# Patient Record
Sex: FEMALE | Race: ASIAN | NOT HISPANIC OR LATINO
[De-identification: names, ages, dates, MRNs, and addresses within clinical notes are randomized per-mention and may not be internally consistent; named-entity substitution may affect disease eponyms.]

---

## 2022-05-19 ENCOUNTER — TRANSCRIPTION ENCOUNTER (OUTPATIENT)
Age: 42
End: 2022-05-19

## 2022-05-19 PROCEDURE — 72197 MRI PELVIS W/O & W/DYE: CPT | Mod: 26

## 2022-05-31 ENCOUNTER — APPOINTMENT (OUTPATIENT)
Dept: MRI IMAGING | Facility: CLINIC | Age: 42
End: 2022-05-31
Payer: COMMERCIAL

## 2022-06-24 ENCOUNTER — RESULT REVIEW (OUTPATIENT)
Age: 42
End: 2022-06-24

## 2022-07-25 PROBLEM — Z00.00 ENCOUNTER FOR PREVENTIVE HEALTH EXAMINATION: Status: ACTIVE | Noted: 2022-07-25

## 2022-07-26 ENCOUNTER — NON-APPOINTMENT (OUTPATIENT)
Age: 42
End: 2022-07-26

## 2022-08-23 ENCOUNTER — NON-APPOINTMENT (OUTPATIENT)
Age: 42
End: 2022-08-23

## 2022-08-23 ENCOUNTER — APPOINTMENT (OUTPATIENT)
Dept: COLORECTAL SURGERY | Facility: CLINIC | Age: 42
End: 2022-08-23

## 2022-08-23 VITALS
WEIGHT: 145 LBS | BODY MASS INDEX: 24.75 KG/M2 | HEIGHT: 64 IN | HEART RATE: 73 BPM | DIASTOLIC BLOOD PRESSURE: 88 MMHG | TEMPERATURE: 97.9 F | SYSTOLIC BLOOD PRESSURE: 130 MMHG

## 2022-08-23 DIAGNOSIS — K62.89 OTHER SPECIFIED DISEASES OF ANUS AND RECTUM: ICD-10-CM

## 2022-08-23 DIAGNOSIS — N80.9 ENDOMETRIOSIS, UNSPECIFIED: ICD-10-CM

## 2022-08-23 PROCEDURE — 99204 OFFICE O/P NEW MOD 45 MIN: CPT

## 2022-08-23 NOTE — PHYSICAL EXAM
[FreeTextEntry1] : General no acute distress, alert and oriented\par Psych calm, pleasant demeanor, responding appropriately to question\par Well nourished\par Comfortable in chair\par Ambulating without assistance\par Nonlabored breathing\par Abdomen soft, nontender, no surgical scars\par

## 2022-08-23 NOTE — HISTORY OF PRESENT ILLNESS
[FreeTextEntry1] : 43 yo F presents for evaluation of endometriosis, referred by GYN Dr. Khari Blancas\par PMH DMII, hypothyroidism, asthma (on Advair seasonally), h/o abnormal cervical paps, s/p LEEP (2016), hysteroscopy (7/2020), s/p polyp removal\par h/o sAB x 1 (IVF)\par \par Per GYN Anita notes, h/o dysmenorrhea, dyspareunia, dyschezia that has been worsening over the last 5 years. Previously on OCPs for 20 years and Nuvaring.\par \par Of note, patient followed by outside GYN in [facility] as well as [facility] and has undergone IVF and embryo transfers in the past. Has been recommended to address pelvic pain/endometriosis prior to consideration of resuming embryo transfers if possible. If fails, plans to adopt.\par \par MRI pelvis completed at [facility] 5/19/22 for surgical planning:\par EXAM: [id number] - MR PELVIS WAW IC - ORDERED BY: KHARI BLANCAS\par \par \par PROCEDURE DATE: 05/19/2022\par \par INTERPRETATION: CLINICAL INFORMATION: Preop for endometriosis surgery\par \par COMPARISON: None.\par \par CONTRAST/COMPLICATIONS:\par IV Contrast: Gadavist 7 cc administered 0 cc discarded.\par Oral Contrast: NONE\par Complications: None reported at time of study completion\par \par PROCEDURE:\par MRI of the pelvis was performed with Endometriosis Protocol.\par Vaginal and Rectal Contrast administered:\par \par FINDINGS:\par UTERUS: 12 x 6 x 8 cm.\par ENDOMETRIUM: 9 mm, normal.\par JUNCTIONAL ZONE: 14 mm.\par CERVIX: Normal, no endometriosis implants.\par \par LEIOMYOMAS: 3 cm degenerated fundal fibroid.\par ADENOMYOMAS: 3 cm left fundal.\par \par ANTERIOR COMPARTMENT:\par \par BLADDER: Within normal limits.\par URETERS: Normal\par VESICOUTERINE POUCH: Normal\par VESICOVAGINAL SEPTUM: Normal\par PREVASCULAR SPACE: Normal\par \par MIDDLE COMPARTMENT:\par \par RIGHT OVARY: 2.6 x 1.4 cm, normal.\par LEFT OVARY: 3.2 x 2.8 cm, corpus luteal cyst. 1 cm endometrial implant medial to the ovary (42:35).\par FALLOPIAN TUBES: 1.5 cm endometrial implant left mesosalpinx (42:34).\par TORUS UTERINUS (Uterine Body): Linear T2 dark bands with tethering suggestive of deep infiltrating endometriosis.\par UTERINE LIGAMENTS: Normal\par VAGINA: Normal\par \par POSTERIOR COMPARTMENT:\par \par RETROCERVICAL SPACE:\par ANTERIOR RECTAL WALL: Possible involvement of the serosa without deep invasion.\par -Length of lesion: 2 cm\par -Circumferential extent (%): 20%\par -Invasion of muscular wall present: No\par -Distance to anal verge: 7 cm\par UTEROSACRAL LIGAMENT: Equivocal involvement\par RECTOVAGINAL SPACE/SEPTUM: Equivocal involvement\par \par ADDITIONAL BOWEL LESIONS PRESENT?: No.\par ADDITIONAL SITES OF ENDOMETRIOSIS?: No.\par \par LYMPH NODES: No pelvic lymphadenopathy.\par PELVIC FREE FLUID: None.\par BONES: Within normal limits.\par \par VISUALIZED UPPER ABDOMINAL ORGANS: Within normal limits.\par \par IMPRESSION:\par \par DIE torus uterus/posterior cul-de-sac. Abutment of the anterior rectal wall without muscular wall invasion.\par \par Small endometriosis implants in the left adnexa.\par \par --- End of Report ---\par \par Colonoscopy completed 6/24/2022 w/ GI Dr. Samira Madden which noted internal and external hemorrhoids, colon was otherwise normal. Random biopsies in rectum performed to r/o endometriosis. Pathology was colonic mucosa w/o abnormality\par \par \par Dr. Blancas has referred patient for combined surgical planning. DOS -  10/24/2022 \par Pt presents for surgical consultation\par \par Pt admits stabbing rectal pain during menses which happen at random times. Has to take ibuprofen 800 mg every 4-5 hours x 2 worst days during menses to manage pain\par Admits to rare instances of BRBPR in setting of straining depending on diet (if eats less fiber)\par BH: once daily\par Typically gets adequate fiber\par Not taking fiber supplementation\par Admits to constipation from ibuprofen takes docusate as needed\par Denies FMH CRC, IBD

## 2022-08-23 NOTE — ASSESSMENT
[FreeTextEntry1] : I had extensive discussion with the patient regarding the diagnosis and treatment options. \par \par Outside records obtained and reviewed. Discussed with referring provider GYN Dr Howard. Patient referred for planning for surgical intervention with concern for multivisceral involvement of endometriosis.\par \par Role for multispecialty surgical planning discussed given prior history and extent of endometriosis seen on imaging.\par \par MRI and colonoscopy reviewed.\par \par Depending on degree of involvement with intestinal tract, we discussed indications for possible colorectal surgical intervention, such as lysis of adhesions, excision of endometrial implants from bowel (shave vs. discoid resection) , and/or segmental bowel resection (appendectomy, small bowel resection, low anterior resection). \par Minimally invasive surgery vs open surgery discussed. \par \par The associated risks, benefits, alternatives of the procedure have been outlined discussed and reviewed with the patient. These risks including but not limited to bleeding, infection, injury to adjacent organs, anastomotic leak, bowel stricture, need for secondary surgery, need for ileostomy or colostomy creation, incisional dehiscence, incisional hernia, change in bowel habits, change in urinary function, nerve injury, change in sexual function, as well as the risk of heart and lung complications, stroke, DVT/PE and death were detailed. \par The possibility of recurrent or continued symptoms, requiring continued medical management despite surgical intervention, was discussed.\par All questions were answered, the patient expressed understanding and consents to the planned procedure. \par \par Appropriate literature regarding surgery and post operative treatment/complications and enhanced recovery pathway will be provided to patient. Consent was obtained.\par  \par \par

## 2022-08-30 DIAGNOSIS — Z83.3 FAMILY HISTORY OF DIABETES MELLITUS: ICD-10-CM

## 2022-08-30 DIAGNOSIS — Z87.891 PERSONAL HISTORY OF NICOTINE DEPENDENCE: ICD-10-CM

## 2022-08-30 DIAGNOSIS — J45.909 UNSPECIFIED ASTHMA, UNCOMPLICATED: ICD-10-CM

## 2022-09-30 ENCOUNTER — RESULT REVIEW (OUTPATIENT)
Age: 42
End: 2022-09-30

## 2022-10-03 ENCOUNTER — OUTPATIENT (OUTPATIENT)
Dept: OUTPATIENT SERVICES | Facility: HOSPITAL | Age: 42
LOS: 1 days | End: 2022-10-03
Payer: COMMERCIAL

## 2022-10-03 DIAGNOSIS — Z01.818 ENCOUNTER FOR OTHER PREPROCEDURAL EXAMINATION: ICD-10-CM

## 2022-10-04 PROCEDURE — 86901 BLOOD TYPING SEROLOGIC RH(D): CPT

## 2022-10-04 PROCEDURE — 86850 RBC ANTIBODY SCREEN: CPT

## 2022-10-04 PROCEDURE — 86900 BLOOD TYPING SEROLOGIC ABO: CPT

## 2022-10-14 DIAGNOSIS — Z01.818 ENCOUNTER FOR OTHER PREPROCEDURAL EXAMINATION: ICD-10-CM

## 2022-10-21 ENCOUNTER — LABORATORY RESULT (OUTPATIENT)
Age: 42
End: 2022-10-21

## 2022-10-21 NOTE — ASU PATIENT PROFILE, ADULT - REASON FOR ADMISSION, PROFILE
robotic excision of endometriosis , robotic myomectomy, -[facility], colon surgery with Dr. Sandoval LEEP

## 2022-10-21 NOTE — ASU PATIENT PROFILE, ADULT - NSICDXPASTMEDICALHX_GEN_ALL_CORE_FT
PAST MEDICAL HISTORY:  Asthma     DM (diabetes mellitus)     Endometriosis     Hypothyroid      PAST MEDICAL HISTORY:  Asthma with allergies    DM (diabetes mellitus)     Endometriosis     Hypothyroid

## 2022-10-23 ENCOUNTER — TRANSCRIPTION ENCOUNTER (OUTPATIENT)
Age: 42
End: 2022-10-23

## 2022-10-24 ENCOUNTER — TRANSCRIPTION ENCOUNTER (OUTPATIENT)
Age: 42
End: 2022-10-24

## 2022-10-24 ENCOUNTER — OUTPATIENT (OUTPATIENT)
Dept: OUTPATIENT SERVICES | Facility: HOSPITAL | Age: 42
LOS: 1 days | Discharge: HOME | End: 2022-10-24
Payer: COMMERCIAL

## 2022-10-24 ENCOUNTER — RESULT REVIEW (OUTPATIENT)
Age: 42
End: 2022-10-24

## 2022-10-24 VITALS
HEART RATE: 58 BPM | DIASTOLIC BLOOD PRESSURE: 76 MMHG | OXYGEN SATURATION: 99 % | SYSTOLIC BLOOD PRESSURE: 152 MMHG | RESPIRATION RATE: 14 BRPM

## 2022-10-24 VITALS
TEMPERATURE: 98 F | DIASTOLIC BLOOD PRESSURE: 83 MMHG | HEART RATE: 68 BPM | RESPIRATION RATE: 16 BRPM | HEIGHT: 64 IN | WEIGHT: 152.78 LBS | OXYGEN SATURATION: 98 % | SYSTOLIC BLOOD PRESSURE: 129 MMHG

## 2022-10-24 DIAGNOSIS — Z98.890 OTHER SPECIFIED POSTPROCEDURAL STATES: Chronic | ICD-10-CM

## 2022-10-24 LAB
GLUCOSE BLDC GLUCOMTR-MCNC: 185 MG/DL — HIGH (ref 70–99)
GLUCOSE BLDC GLUCOMTR-MCNC: 191 MG/DL — HIGH (ref 70–99)

## 2022-10-24 PROCEDURE — 88341 IMHCHEM/IMCYTCHM EA ADD ANTB: CPT

## 2022-10-24 PROCEDURE — 88360 TUMOR IMMUNOHISTOCHEM/MANUAL: CPT | Mod: 26

## 2022-10-24 PROCEDURE — 88304 TISSUE EXAM BY PATHOLOGIST: CPT

## 2022-10-24 PROCEDURE — 88341 IMHCHEM/IMCYTCHM EA ADD ANTB: CPT | Mod: 26,59

## 2022-10-24 PROCEDURE — 88307 TISSUE EXAM BY PATHOLOGIST: CPT | Mod: 26

## 2022-10-24 PROCEDURE — 88307 TISSUE EXAM BY PATHOLOGIST: CPT

## 2022-10-24 PROCEDURE — 88304 TISSUE EXAM BY PATHOLOGIST: CPT | Mod: 26

## 2022-10-24 PROCEDURE — 82962 GLUCOSE BLOOD TEST: CPT

## 2022-10-24 PROCEDURE — 88342 IMHCHEM/IMCYTCHM 1ST ANTB: CPT | Mod: 26,59

## 2022-10-24 PROCEDURE — 88360 TUMOR IMMUNOHISTOCHEM/MANUAL: CPT

## 2022-10-24 PROCEDURE — 57522 CONIZATION OF CERVIX: CPT

## 2022-10-24 RX ORDER — ACETAMINOPHEN 500 MG
1000 TABLET ORAL ONCE
Refills: 0 | Status: DISCONTINUED | OUTPATIENT
Start: 2022-10-24 | End: 2022-10-24

## 2022-10-24 RX ORDER — ONDANSETRON 8 MG/1
8 TABLET, FILM COATED ORAL ONCE
Refills: 0 | Status: DISCONTINUED | OUTPATIENT
Start: 2022-10-24 | End: 2022-10-24

## 2022-10-24 RX ORDER — IODINE/POTASSIUM IODIDE 5 %-10 %
1 SOLUTION, NON-ORAL TOPICAL ONCE
Refills: 0 | Status: DISCONTINUED | OUTPATIENT
Start: 2022-10-24 | End: 2022-10-24

## 2022-10-24 RX ORDER — METFORMIN HYDROCHLORIDE 850 MG/1
1 TABLET ORAL
Qty: 0 | Refills: 0 | DISCHARGE

## 2022-10-24 RX ORDER — SIMETHICONE 80 MG/1
80 TABLET, CHEWABLE ORAL ONCE
Refills: 0 | Status: DISCONTINUED | OUTPATIENT
Start: 2022-10-24 | End: 2022-10-24

## 2022-10-24 RX ORDER — OXYCODONE HYDROCHLORIDE 5 MG/1
5 TABLET ORAL ONCE
Refills: 0 | Status: DISCONTINUED | OUTPATIENT
Start: 2022-10-24 | End: 2022-10-24

## 2022-10-24 RX ORDER — LEVOTHYROXINE SODIUM 125 MCG
1 TABLET ORAL
Qty: 0 | Refills: 0 | DISCHARGE

## 2022-10-24 RX ORDER — ACETIC ACID
1 LIQUID (ML) MISCELLANEOUS ONCE
Refills: 0 | Status: DISCONTINUED | OUTPATIENT
Start: 2022-10-24 | End: 2022-10-24

## 2022-10-24 RX ADMIN — OXYCODONE HYDROCHLORIDE 5 MILLIGRAM(S): 5 TABLET ORAL at 15:37

## 2022-10-24 RX ADMIN — OXYCODONE HYDROCHLORIDE 5 MILLIGRAM(S): 5 TABLET ORAL at 15:55

## 2022-10-24 NOTE — ASU DISCHARGE PLAN (ADULT/PEDIATRIC) - NS MD DC FALL RISK RISK
For information on Fall & Injury Prevention, visit: [email or web address] OR  [email or web address] OR  https://www.cdc.gov/steadi/patient.html

## 2022-10-24 NOTE — ASU DISCHARGE PLAN (ADULT/PEDIATRIC) - CARE PROVIDER_API CALL
Emily Howard (MD)  Female Pelvic MedReconst Surg; Obstetrics and Gynecology  [address] [place], [place] [address]  Phone: [phone]  Fax: [phone]  Follow Up Time:

## 2022-10-24 NOTE — PRE-ANESTHESIA EVALUATION ADULT - NSANTHOSAYNRD_GEN_A_CORE
No. LELA screening performed.  STOP BANG Legend: 0-2 = LOW Risk; 3-4 = INTERMEDIATE Risk; 5-8 = HIGH Risk

## 2022-10-24 NOTE — PRE-OP CHECKLIST - WEIGHT IN KG
69.3 Detail Level: Generalized Quality 110: Preventive Care And Screening: Influenza Immunization: Influenza Immunization Administered during Influenza season

## 2022-10-31 PROBLEM — E03.9 HYPOTHYROIDISM, UNSPECIFIED: Chronic | Status: ACTIVE | Noted: 2022-10-21

## 2022-10-31 PROBLEM — E11.9 TYPE 2 DIABETES MELLITUS WITHOUT COMPLICATIONS: Chronic | Status: ACTIVE | Noted: 2022-10-21

## 2022-10-31 PROBLEM — J45.909 UNSPECIFIED ASTHMA, UNCOMPLICATED: Chronic | Status: ACTIVE | Noted: 2022-10-21

## 2022-10-31 PROBLEM — N80.9 ENDOMETRIOSIS, UNSPECIFIED: Chronic | Status: ACTIVE | Noted: 2022-10-21

## 2022-11-01 LAB — SURGICAL PATHOLOGY STUDY: SIGNIFICANT CHANGE UP

## 2022-12-11 ENCOUNTER — TRANSCRIPTION ENCOUNTER (OUTPATIENT)
Age: 42
End: 2022-12-11

## 2022-12-12 ENCOUNTER — TRANSCRIPTION ENCOUNTER (OUTPATIENT)
Age: 42
End: 2022-12-12

## 2022-12-12 ENCOUNTER — APPOINTMENT (OUTPATIENT)
Dept: COLORECTAL SURGERY | Facility: HOSPITAL | Age: 42
End: 2022-12-12

## 2022-12-12 ENCOUNTER — OUTPATIENT (OUTPATIENT)
Dept: OUTPATIENT SERVICES | Facility: HOSPITAL | Age: 42
LOS: 1 days | Discharge: HOME | End: 2022-12-12
Payer: COMMERCIAL

## 2022-12-12 ENCOUNTER — APPOINTMENT (OUTPATIENT)
Dept: COLORECTAL SURGERY | Facility: CLINIC | Age: 42
End: 2022-12-12

## 2022-12-12 VITALS
SYSTOLIC BLOOD PRESSURE: 134 MMHG | HEART RATE: 68 BPM | DIASTOLIC BLOOD PRESSURE: 90 MMHG | RESPIRATION RATE: 16 BRPM | WEIGHT: 147.27 LBS | OXYGEN SATURATION: 97 % | TEMPERATURE: 97 F | HEIGHT: 64 IN

## 2022-12-12 VITALS
DIASTOLIC BLOOD PRESSURE: 72 MMHG | RESPIRATION RATE: 17 BRPM | HEART RATE: 65 BPM | SYSTOLIC BLOOD PRESSURE: 122 MMHG | OXYGEN SATURATION: 99 %

## 2022-12-12 DIAGNOSIS — Z98.890 OTHER SPECIFIED POSTPROCEDURAL STATES: Chronic | ICD-10-CM

## 2022-12-12 LAB
ANION GAP SERPL CALC-SCNC: 12 MMOL/L — SIGNIFICANT CHANGE UP (ref 5–17)
BASOPHILS # BLD AUTO: 0.04 K/UL — SIGNIFICANT CHANGE UP (ref 0–0.2)
BASOPHILS NFR BLD AUTO: 0.5 % — SIGNIFICANT CHANGE UP (ref 0–2)
BLD GP AB SCN SERPL QL: NEGATIVE — SIGNIFICANT CHANGE UP
BUN SERPL-MCNC: 9 MG/DL — SIGNIFICANT CHANGE UP (ref 7–23)
CALCIUM SERPL-MCNC: 9.2 MG/DL — SIGNIFICANT CHANGE UP (ref 8.4–10.5)
CHLORIDE SERPL-SCNC: 99 MMOL/L — SIGNIFICANT CHANGE UP (ref 96–108)
CO2 SERPL-SCNC: 23 MMOL/L — SIGNIFICANT CHANGE UP (ref 22–31)
CREAT SERPL-MCNC: 0.56 MG/DL — SIGNIFICANT CHANGE UP (ref 0.5–1.3)
EGFR: 115 ML/MIN/1.73M2 — SIGNIFICANT CHANGE UP
EOSINOPHIL # BLD AUTO: 0.04 K/UL — SIGNIFICANT CHANGE UP (ref 0–0.5)
EOSINOPHIL NFR BLD AUTO: 0.5 % — SIGNIFICANT CHANGE UP (ref 0–6)
GLUCOSE BLDC GLUCOMTR-MCNC: 152 MG/DL — HIGH (ref 70–99)
GLUCOSE BLDC GLUCOMTR-MCNC: 204 MG/DL — HIGH (ref 70–99)
GLUCOSE BLDC GLUCOMTR-MCNC: 277 MG/DL — HIGH (ref 70–99)
GLUCOSE BLDC GLUCOMTR-MCNC: 284 MG/DL — HIGH (ref 70–99)
GLUCOSE SERPL-MCNC: 210 MG/DL — HIGH (ref 70–99)
HCT VFR BLD CALC: 37.4 % — SIGNIFICANT CHANGE UP (ref 34.5–45)
HGB BLD-MCNC: 12.4 G/DL — SIGNIFICANT CHANGE UP (ref 11.5–15.5)
IMM GRANULOCYTES NFR BLD AUTO: 0.3 % — SIGNIFICANT CHANGE UP (ref 0–0.9)
LYMPHOCYTES # BLD AUTO: 1.85 K/UL — SIGNIFICANT CHANGE UP (ref 1–3.3)
LYMPHOCYTES # BLD AUTO: 21.4 % — SIGNIFICANT CHANGE UP (ref 13–44)
MCHC RBC-ENTMCNC: 26.4 PG — LOW (ref 27–34)
MCHC RBC-ENTMCNC: 33.2 GM/DL — SIGNIFICANT CHANGE UP (ref 32–36)
MCV RBC AUTO: 79.7 FL — LOW (ref 80–100)
MONOCYTES # BLD AUTO: 0.28 K/UL — SIGNIFICANT CHANGE UP (ref 0–0.9)
MONOCYTES NFR BLD AUTO: 3.2 % — SIGNIFICANT CHANGE UP (ref 2–14)
NEUTROPHILS # BLD AUTO: 6.42 K/UL — SIGNIFICANT CHANGE UP (ref 1.8–7.4)
NEUTROPHILS NFR BLD AUTO: 74.1 % — SIGNIFICANT CHANGE UP (ref 43–77)
NRBC # BLD: 0 /100 WBCS — SIGNIFICANT CHANGE UP (ref 0–0)
PLATELET # BLD AUTO: 356 K/UL — SIGNIFICANT CHANGE UP (ref 150–400)
POTASSIUM SERPL-MCNC: 4.3 MMOL/L — SIGNIFICANT CHANGE UP (ref 3.5–5.3)
POTASSIUM SERPL-SCNC: 4.3 MMOL/L — SIGNIFICANT CHANGE UP (ref 3.5–5.3)
RBC # BLD: 4.69 M/UL — SIGNIFICANT CHANGE UP (ref 3.8–5.2)
RBC # FLD: 12.8 % — SIGNIFICANT CHANGE UP (ref 10.3–14.5)
RH IG SCN BLD-IMP: POSITIVE — SIGNIFICANT CHANGE UP
SODIUM SERPL-SCNC: 134 MMOL/L — LOW (ref 135–145)
WBC # BLD: 8.66 K/UL — SIGNIFICANT CHANGE UP (ref 3.8–10.5)
WBC # FLD AUTO: 8.66 K/UL — SIGNIFICANT CHANGE UP (ref 3.8–10.5)

## 2022-12-12 PROCEDURE — 58661 LAPAROSCOPY REMOVE ADNEXA: CPT | Mod: LT

## 2022-12-12 PROCEDURE — S2900: CPT

## 2022-12-12 PROCEDURE — 86901 BLOOD TYPING SEROLOGIC RH(D): CPT

## 2022-12-12 PROCEDURE — 86900 BLOOD TYPING SEROLOGIC ABO: CPT

## 2022-12-12 PROCEDURE — 86850 RBC ANTIBODY SCREEN: CPT

## 2022-12-12 PROCEDURE — 58662 LAPAROSCOPY EXCISE LESIONS: CPT

## 2022-12-12 PROCEDURE — 45330 DIAGNOSTIC SIGMOIDOSCOPY: CPT

## 2022-12-12 PROCEDURE — 45330 DIAGNOSTIC SIGMOIDOSCOPY: CPT | Mod: GC

## 2022-12-12 PROCEDURE — 82962 GLUCOSE BLOOD TEST: CPT

## 2022-12-12 PROCEDURE — 58662 LAPAROSCOPY EXCISE LESIONS: CPT | Mod: AS

## 2022-12-12 PROCEDURE — 88305 TISSUE EXAM BY PATHOLOGIST: CPT | Mod: 26

## 2022-12-12 PROCEDURE — C1889: CPT

## 2022-12-12 PROCEDURE — 85025 COMPLETE CBC W/AUTO DIFF WBC: CPT

## 2022-12-12 PROCEDURE — 80048 BASIC METABOLIC PNL TOTAL CA: CPT

## 2022-12-12 PROCEDURE — 58662 LAPAROSCOPY EXCISE LESIONS: CPT | Mod: GC

## 2022-12-12 PROCEDURE — 58545 LAPAROSCOPIC MYOMECTOMY: CPT | Mod: AS

## 2022-12-12 PROCEDURE — 88305 TISSUE EXAM BY PATHOLOGIST: CPT

## 2022-12-12 PROCEDURE — 58545 LAPAROSCOPIC MYOMECTOMY: CPT

## 2022-12-12 PROCEDURE — 57455 BIOPSY OF CERVIX W/SCOPE: CPT

## 2022-12-12 DEVICE — SURGIFLO HEMOSTATIC MATRIX KIT: Type: IMPLANTABLE DEVICE | Status: FUNCTIONAL

## 2022-12-12 RX ORDER — PHENAZOPYRIDINE HCL 100 MG
200 TABLET ORAL ONCE
Refills: 0 | Status: COMPLETED | OUTPATIENT
Start: 2022-12-12 | End: 2022-12-12

## 2022-12-12 RX ORDER — ACETAMINOPHEN 500 MG
1000 TABLET ORAL ONCE
Refills: 0 | Status: COMPLETED | OUTPATIENT
Start: 2022-12-12 | End: 2022-12-12

## 2022-12-12 RX ORDER — KETOROLAC TROMETHAMINE 30 MG/ML
30 SYRINGE (ML) INJECTION ONCE
Refills: 0 | Status: DISCONTINUED | OUTPATIENT
Start: 2022-12-12 | End: 2022-12-12

## 2022-12-12 RX ORDER — SODIUM CHLORIDE 9 MG/ML
1000 INJECTION, SOLUTION INTRAVENOUS
Refills: 0 | Status: DISCONTINUED | OUTPATIENT
Start: 2022-12-12 | End: 2022-12-12

## 2022-12-12 RX ORDER — GLUCAGON INJECTION, SOLUTION 0.5 MG/.1ML
1 INJECTION, SOLUTION SUBCUTANEOUS ONCE
Refills: 0 | Status: DISCONTINUED | OUTPATIENT
Start: 2022-12-12 | End: 2022-12-12

## 2022-12-12 RX ORDER — HYDROMORPHONE HYDROCHLORIDE 2 MG/ML
0.5 INJECTION INTRAMUSCULAR; INTRAVENOUS; SUBCUTANEOUS
Refills: 0 | Status: DISCONTINUED | OUTPATIENT
Start: 2022-12-12 | End: 2022-12-12

## 2022-12-12 RX ORDER — OXYCODONE HYDROCHLORIDE 5 MG/1
5 TABLET ORAL ONCE
Refills: 0 | Status: DISCONTINUED | OUTPATIENT
Start: 2022-12-12 | End: 2022-12-12

## 2022-12-12 RX ORDER — ONDANSETRON 8 MG/1
8 TABLET, FILM COATED ORAL ONCE
Refills: 0 | Status: COMPLETED | OUTPATIENT
Start: 2022-12-12 | End: 2022-12-12

## 2022-12-12 RX ORDER — ACETAMINOPHEN 500 MG
1000 TABLET ORAL ONCE
Refills: 0 | Status: DISCONTINUED | OUTPATIENT
Start: 2022-12-12 | End: 2022-12-12

## 2022-12-12 RX ORDER — DEXTROSE 50 % IN WATER 50 %
25 SYRINGE (ML) INTRAVENOUS ONCE
Refills: 0 | Status: DISCONTINUED | OUTPATIENT
Start: 2022-12-12 | End: 2022-12-12

## 2022-12-12 RX ORDER — DEXTROSE 50 % IN WATER 50 %
15 SYRINGE (ML) INTRAVENOUS ONCE
Refills: 0 | Status: DISCONTINUED | OUTPATIENT
Start: 2022-12-12 | End: 2022-12-12

## 2022-12-12 RX ORDER — SIMETHICONE 80 MG/1
80 TABLET, CHEWABLE ORAL ONCE
Refills: 0 | Status: DISCONTINUED | OUTPATIENT
Start: 2022-12-12 | End: 2022-12-12

## 2022-12-12 RX ORDER — PANTOPRAZOLE SODIUM 20 MG/1
20 TABLET, DELAYED RELEASE ORAL ONCE
Refills: 0 | Status: COMPLETED | OUTPATIENT
Start: 2022-12-12 | End: 2022-12-12

## 2022-12-12 RX ADMIN — ONDANSETRON 8 MILLIGRAM(S): 8 TABLET, FILM COATED ORAL at 17:28

## 2022-12-12 RX ADMIN — Medication 400 MILLIGRAM(S): at 14:23

## 2022-12-12 RX ADMIN — Medication 30 MILLIGRAM(S): at 16:30

## 2022-12-12 RX ADMIN — Medication 30 MILLIGRAM(S): at 18:19

## 2022-12-12 RX ADMIN — HYDROMORPHONE HYDROCHLORIDE 0.5 MILLIGRAM(S): 2 INJECTION INTRAMUSCULAR; INTRAVENOUS; SUBCUTANEOUS at 15:22

## 2022-12-12 RX ADMIN — Medication 200 MILLIGRAM(S): at 07:05

## 2022-12-12 RX ADMIN — Medication 1000 MILLIGRAM(S): at 14:55

## 2022-12-12 RX ADMIN — PANTOPRAZOLE SODIUM 20 MILLIGRAM(S): 20 TABLET, DELAYED RELEASE ORAL at 16:31

## 2022-12-12 RX ADMIN — HYDROMORPHONE HYDROCHLORIDE 0.5 MILLIGRAM(S): 2 INJECTION INTRAMUSCULAR; INTRAVENOUS; SUBCUTANEOUS at 15:07

## 2022-12-12 NOTE — ASU PATIENT PROFILE, ADULT - NSICDXPASTMEDICALHX_GEN_ALL_CORE_FT
PAST MEDICAL HISTORY:  Asthma with allergies    DM (diabetes mellitus)     Endometriosis     Hypothyroid

## 2022-12-12 NOTE — BRIEF OPERATIVE NOTE - NSICDXBRIEFPREOP_GEN_ALL_CORE_FT
PRE-OP DIAGNOSIS:  Endometriosis 12-Dec-2022 13:17:29  Max Tabor  
PRE-OP DIAGNOSIS:  Uterine myoma 12-Dec-2022 13:52:12  Mariela Orta

## 2022-12-12 NOTE — CHART NOTE - NSCHARTNOTEFT_GEN_A_CORE
GYN POC    Pt seen and examined at bedside. Pt complains of mild abdominal "numbness". Otherwise feeling well, ambulating without assistance, voided, not yet passing flatus. Pt denies lightheadedness/dizziness, HA, fevers/chills, chest pain, palpitations, SOB, severe abdominal pain, N/V/D, or dysuria.      T(F): 98.2 (12-12-22 @ 14:13), Max: 98.2 (12-12-22 @ 14:13)  HR: 65 (12-12-22 @ 18:00) (47 - 68)  BP: 122/72 (12-12-22 @ 18:00) (117/67 - 141/71)  RR: 17 (12-12-22 @ 18:00) (12 - 17)  SpO2: 99% (12-12-22 @ 18:00) (95% - 100%)  Wt(kg): --    12-12 @ 07:01  -  12-12 @ 18:32  --------------------------------------------------------  IN: 375 mL / OUT: 200 mL / NET: 175 mL        acetaminophen     Tablet .. 1000 milliGRAM(s) Oral Once  dextrose 5%. 1000 milliLiter(s) IV Continuous <Continuous>  dextrose 50% Injectable 25 Gram(s) IV Push once  dextrose Oral Gel 15 Gram(s) Oral once PRN Blood Glucose LESS THAN 70 milliGRAM(s)/deciliter  glucagon  Injectable 1 milliGRAM(s) IntraMuscular once  HYDROmorphone  Injectable 0.5 milliGRAM(s) IV Push every 15 minutes PRN Severe Pain (7 - 10)  lactated ringers. 1000 milliLiter(s) IV Continuous <Continuous>  oxyCODONE    IR 5 milliGRAM(s) Oral Once PRN Moderate Pain (4 - 6)  simethicone 80 milliGRAM(s) Chew Once PRN Gas      Physical exam:  Constitutional: NAD  Pulmonary: clear to auscultation bilaterally  Cardiovascular: regular rate and rhythm  Abdomen: incision sites clean, dry and intact. Soft, mildly tender, nondistended  Extremities: no lower extremity edema, or calf tenderness    A/P: 41yo s/p RA l/s endo excision, rectal implant resection with flex sig +neg air leak test, L salpingectomy for hydrosalpinx, myomectomy. EBL 50.     Neuro: Toradol/Tylenol standing, Oxycodone prn, Dilaudid for breakthrough  Cardio: vital signs stable, continue to monitor per protocol  Pulm: incentive spirometer at least 10 times per hour while awake   GI:  Reg diet, Reglan, Zofran prn  : voiding    - Pt stable for d/c at this time

## 2022-12-12 NOTE — BRIEF OPERATIVE NOTE - NSICDXBRIEFPROCEDURE_GEN_ALL_CORE_FT
PROCEDURES:  Myomectomy, robot-assisted, laparoscopic, 1-4 myomas 12-Dec-2022 13:51:21  Mariela Orta  Left salpingectomy 12-Dec-2022 13:51:48  Mariela Orta  
PROCEDURES:  Robot-assisted laparoscopic excision of endometriosis 12-Dec-2022 13:18:07  Max Tabor  Flexible sigmoidoscopy 12-Dec-2022 13:18:16  Max Tabor

## 2022-12-12 NOTE — BRIEF OPERATIVE NOTE - NSICDXBRIEFPOSTOP_GEN_ALL_CORE_FT
POST-OP DIAGNOSIS:  Endometriosis 12-Dec-2022 13:17:39  Max Tabor  
POST-OP DIAGNOSIS:  Uterine myoma 12-Dec-2022 13:52:18  Mariela Orta

## 2022-12-12 NOTE — BRIEF OPERATIVE NOTE - OPERATION/FINDINGS
Majority of case completed by OBGYN team, please refer to separate brief op note for further details.     Multiple rectosigmoid superficial serosal endometrial implants sharply excised, single rectal serosal endometrial nodule appreciated and sharply excised, serosal defect oversewn w vicryl suture. Flex sig done with patient in supine position, negative air leak test. Majority of case completed by OBGYN team, please refer to separate brief op note for further details.     Multiple rectosigmoid superficial serosal endometrial adhesions sharply excised. Multiple endometrial implants excised from peritoneal surface of the sigmoid colon mesenteric fat. A single rectal serosal endometrial nodule appreciated and sharply excised, serosal excision site oversewn w 3-0 silk suture. Flex sig done with patient in lithotomy position, negative air leak test.

## 2022-12-12 NOTE — ASU DISCHARGE PLAN (ADULT/PEDIATRIC) - CARE PROVIDER_API CALL
Emily Howard (MD)  Female Pelvic MedReconst Surg; Obstetrics and Gynecology  [address] [place], [place] [address]  Phone: [phone]  Fax: [phone]  Follow Up Time: 2 weeks

## 2022-12-12 NOTE — ASU DISCHARGE PLAN (ADULT/PEDIATRIC) - NS MD DC FALL RISK RISK
For information on Fall & Injury Prevention, visit: [email or web address] OR  [email or web address] OR  https://www.cdc.gov/steadi/patient.html

## 2023-01-11 LAB — SURGICAL PATHOLOGY STUDY: SIGNIFICANT CHANGE UP

## 2023-01-13 ENCOUNTER — APPOINTMENT (OUTPATIENT)
Dept: ENDOCRINOLOGY | Facility: CLINIC | Age: 43
End: 2023-01-13
Payer: COMMERCIAL

## 2023-01-13 ENCOUNTER — NON-APPOINTMENT (OUTPATIENT)
Age: 43
End: 2023-01-13

## 2023-01-13 VITALS
SYSTOLIC BLOOD PRESSURE: 130 MMHG | WEIGHT: 141 LBS | TEMPERATURE: 97.4 F | DIASTOLIC BLOOD PRESSURE: 80 MMHG | HEIGHT: 64 IN | HEART RATE: 68 BPM | OXYGEN SATURATION: 98 % | BODY MASS INDEX: 24.07 KG/M2

## 2023-01-13 DIAGNOSIS — Z83.3 FAMILY HISTORY OF DIABETES MELLITUS: ICD-10-CM

## 2023-01-13 LAB — HBA1C MFR BLD HPLC: 8.1

## 2023-01-13 PROCEDURE — 83036 HEMOGLOBIN GLYCOSYLATED A1C: CPT | Mod: QW

## 2023-01-13 PROCEDURE — 99205 OFFICE O/P NEW HI 60 MIN: CPT | Mod: 25

## 2023-01-13 RX ORDER — ACETYLCYSTEINE 600 MG
340 CAPSULE ORAL
Refills: 0 | Status: ACTIVE | COMMUNITY

## 2023-01-13 RX ORDER — CHOLECALCIFEROL (VITAMIN D3) 25 MCG
TABLET ORAL
Refills: 0 | Status: ACTIVE | COMMUNITY

## 2023-01-13 RX ORDER — ELECTROLYTES/DEXTROSE
SOLUTION, ORAL ORAL
Refills: 0 | Status: ACTIVE | COMMUNITY

## 2023-01-13 RX ORDER — BLOOD-GLUCOSE METER
W/DEVICE EACH MISCELLANEOUS
Qty: 1 | Refills: 0 | Status: ACTIVE | COMMUNITY
Start: 2023-01-13 | End: 1900-01-01

## 2023-01-13 RX ORDER — CIPROFLOXACIN HYDROCHLORIDE 500 MG/1
500 TABLET, FILM COATED ORAL
Qty: 2 | Refills: 0 | Status: DISCONTINUED | COMMUNITY
Start: 2022-11-29 | End: 2023-01-13

## 2023-01-13 RX ORDER — METRONIDAZOLE 500 MG/1
500 TABLET ORAL
Qty: 6 | Refills: 0 | Status: DISCONTINUED | COMMUNITY
Start: 2022-11-29 | End: 2023-01-13

## 2023-01-13 RX ORDER — METFORMIN ER 500 MG 500 MG/1
500 TABLET ORAL
Qty: 360 | Refills: 3 | Status: DISCONTINUED | COMMUNITY
End: 2023-01-13

## 2023-01-13 RX ORDER — BACILLUS COAGULANS/INULIN 1B-250 MG
CAPSULE ORAL
Refills: 0 | Status: ACTIVE | COMMUNITY

## 2023-01-13 NOTE — REASON FOR VISIT
[Initial Evaluation] : an initial evaluation [DM Type 2] : DM Type 2 [Hypothyroidism] : hypothyroidism [FreeTextEntry2] : Dr. Emily Howard; Dr. Frannie Wilkins

## 2023-01-13 NOTE — HISTORY OF PRESENT ILLNESS
[FreeTextEntry1] : Patient is a 43 yo woman here for diabetes\par \par Prediabetes was diagnosed in 2021 then at the end of 2021, she was diagnosed with diabetes. Her PCP at [facility] diagnosed her on routine blood work.  She was started on metformin and was advised to see an endocrinologist.  She had a tough time getting an endocrine visit and had endometrial issues in the interval . Today is her first endocrinology visit.  Patient started looking up information online and is on an anti-inflammatory diet.  She reports that diabetes happened after IVF during which she gained weight.  Patient has had several miscarriages and she was diagnosed with endometriosis and inflammation. Right now, she is optimizing her health to do embryo transfer\par Menarche at age 7; pubic hair growth/breast growth all within expected ranges\par Periods had always been very heavy and debilitating in the past\par Diet: leafy greens, salmon; decreased red meat\par Breakfast: banana and coffee; whole grain cereal; oats\par Lunch: vegetable wrap with avocados, olive oil, spinach or kale; sweet peppers; yogurt\par Dinner: sushi\par Has a sweet tooth and had two cupcakes last night\par No significant potato chips\par Has apple juice on occasion; no soda\par Prior to four weeks, diet was not as healthy.  She would get [employer] daily\par Dilated eye exam over 1 year ago\par \par Diagnosed with hypothyroidism: on levothyroxine 25 mcg years ago.  Patient stopped taking it October 2022 because she forgot to take medicine and didn't feel like it was doing anything.\par She is currently on many anti-inflammatory medicines for conceptions\par No family hx of thyroid disease

## 2023-01-13 NOTE — REVIEW OF SYSTEMS
[Fatigue] : no fatigue [Decreased Appetite] : appetite not decreased [Dysphagia] : no dysphagia [Dysphonia] : no dysphonia [Chest Pain] : no chest pain [Palpitations] : no palpitations [Shortness Of Breath] : no shortness of breath [Nausea] : no nausea [Constipation] : no constipation [Diarrhea] : no diarrhea

## 2023-01-13 NOTE — PHYSICAL EXAM
[Alert] : alert [Well Nourished] : well nourished [No Acute Distress] : no acute distress [EOMI] : extra ocular movement intact [Thyroid Not Enlarged] : the thyroid was not enlarged [No Respiratory Distress] : no respiratory distress [Clear to Auscultation] : lungs were clear to auscultation bilaterally [Normal S1, S2] : normal S1 and S2 [Normal Rate] : heart rate was normal [Normal Bowel Sounds] : normal bowel sounds [Not Tender] : non-tender [Soft] : abdomen soft [Right foot was examined, including] : right foot ~C was examined, including visual inspection with sensory and pulse exams [Left foot was examined, including] : left foot ~C was examined, including visual inspection with sensory and pulse exams [2+] : 2+ in the dorsalis pedis [No Motor Deficits] : the motor exam was normal [Normal Affect] : the affect was normal [Normal Insight/Judgement] : insight and judgment were intact [Normal Mood] : the mood was normal [Normal Hearing] : hearing was normal [Foot Ulcers] : no foot ulcers [#1 Diminished] : number 1 was normal [#2 Diminished] : number 2 was normal [#3 Diminished] : number 3 was normal [#4 Diminished] : number 4 was normal [#5 Diminished] : number 5 was normal [#6 Diminished] : number 6 was normal [#7 Diminished] : number 7 was normal [#8 Diminished] : number 8 was normal [#9 Diminished] : number 9 was normal [#10 Diminished] : number 10 was normal

## 2023-01-13 NOTE — CONSULT LETTER
[Dear  ___] : Dear  [unfilled], [Consult Letter:] : I had the pleasure of evaluating your patient, [unfilled]. [Please see my note below.] : Please see my note below. [Consult Closing:] : Thank you very much for allowing me to participate in the care of this patient.  If you have any questions, please do not hesitate to contact me. [Sincerely,] : Sincerely, [FreeTextEntry3] : Laura Corona MD [DrMoon  ___] : Dr. MOORE

## 2023-01-13 NOTE — ASSESSMENT
[Diabetes Foot Care] : diabetes foot care [Long Term Vascular Complications] : long term vascular complications of diabetes [Carbohydrate Consistent Diet] : carbohydrate consistent diet [Importance of Diet and Exercise] : importance of diet and exercise to improve glycemic control, achieve weight loss and improve cardiovascular health [Self Monitoring of Blood Glucose] : self monitoring of blood glucose [Retinopathy Screening] : Patient was referred to ophthalmology for retinopathy screening [FreeTextEntry1] : Patient is a 41 yo woman with type 2 diabetes and hypothyroidism here for endocrine consultation\par \par 1. Uncontrolled Type 2 DM\par -POCT today is 8.1% and above goal\par -discussed the risks of micro/macrovascular events including, but not limited to, heart attack/stroke/eye complications/kidney disease at length\par -importance of glycemic control discussed; goal A1c of 6.5%. Educated that to optimize conception and decrease maternal/fetal risks, A1c should be 6.5%\par -reports she stopped taking metformin for a little bit.  Expresses high pill burden.  For now, start metformin 1000 mg once daily for a week. If no GI side effects, increase to 1000 mg BID.  Diarrhea as a side effect was discussed\par -nutritional counseling provided; patient states she has a decent handle of diet. Admits that sweets are a struggle\par -importance of self-monitoring of blood glucose also discussed.  Goal fasting glucose 100-130 \par -dilated eye exam required; ophthalmology referral provided\par -monofilament testing done\par -increase metformin 1000 mg BID\par -confirm serum A1c levels, screen for nephropathy\par -glucometer sent to pharmacy\par -metformin renewed\par \par 2. Hypothyroid\par -patient reports history of hypothyroidism though details are a bit unclear\par -she was on Lt4 but stopped it October 2022\par -repeat TFTs\par -goal TSH of 2.5\par \par 3. HLD\par -LDL goal < 70\par -check lipid profile\par \par Follow up in 3 months. \par

## 2023-01-23 LAB
ALBUMIN SERPL ELPH-MCNC: 4.9 G/DL
ALP BLD-CCNC: 80 U/L
ALT SERPL-CCNC: 34 U/L
ANION GAP SERPL CALC-SCNC: 12 MMOL/L
AST SERPL-CCNC: 25 U/L
BILIRUB SERPL-MCNC: 0.2 MG/DL
BUN SERPL-MCNC: 8 MG/DL
CALCIUM SERPL-MCNC: 9.7 MG/DL
CHLORIDE SERPL-SCNC: 104 MMOL/L
CHOLEST SERPL-MCNC: 235 MG/DL
CO2 SERPL-SCNC: 23 MMOL/L
CREAT SERPL-MCNC: 0.56 MG/DL
CREAT SPEC-SCNC: 14 MG/DL
EGFR: 115 ML/MIN/1.73M2
ESTIMATED AVERAGE GLUCOSE: 177 MG/DL
GLUCOSE SERPL-MCNC: 131 MG/DL
HBA1C MFR BLD HPLC: 7.8 %
HDLC SERPL-MCNC: 49 MG/DL
LDLC SERPL CALC-MCNC: 150 MG/DL
MICROALBUMIN 24H UR DL<=1MG/L-MCNC: <1.2 MG/DL
MICROALBUMIN/CREAT 24H UR-RTO: NORMAL MG/G
NONHDLC SERPL-MCNC: 186 MG/DL
POTASSIUM SERPL-SCNC: 4.2 MMOL/L
PROT SERPL-MCNC: 7.6 G/DL
SODIUM SERPL-SCNC: 139 MMOL/L
TRIGL SERPL-MCNC: 185 MG/DL
TSH SERPL-ACNC: 1.7 UIU/ML

## 2023-03-16 ENCOUNTER — TRANSCRIPTION ENCOUNTER (OUTPATIENT)
Age: 43
End: 2023-03-16

## 2023-03-16 RX ORDER — METFORMIN HYDROCHLORIDE 1000 MG/1
1000 TABLET, COATED ORAL TWICE DAILY
Qty: 1 | Refills: 3 | Status: DISCONTINUED | COMMUNITY
Start: 2023-01-13 | End: 2023-03-16

## 2023-03-17 ENCOUNTER — TRANSCRIPTION ENCOUNTER (OUTPATIENT)
Age: 43
End: 2023-03-17

## 2023-03-20 ENCOUNTER — TRANSCRIPTION ENCOUNTER (OUTPATIENT)
Age: 43
End: 2023-03-20

## 2023-07-12 ENCOUNTER — APPOINTMENT (OUTPATIENT)
Dept: ENDOCRINOLOGY | Facility: CLINIC | Age: 43
End: 2023-07-12
Payer: COMMERCIAL

## 2023-07-12 VITALS
OXYGEN SATURATION: 98 % | DIASTOLIC BLOOD PRESSURE: 82 MMHG | TEMPERATURE: 97.2 F | BODY MASS INDEX: 22.71 KG/M2 | HEART RATE: 79 BPM | SYSTOLIC BLOOD PRESSURE: 115 MMHG | HEIGHT: 64 IN | WEIGHT: 133 LBS

## 2023-07-12 LAB — HBA1C MFR BLD HPLC: 6.3

## 2023-07-12 PROCEDURE — 99214 OFFICE O/P EST MOD 30 MIN: CPT | Mod: 25

## 2023-07-12 PROCEDURE — 83036 HEMOGLOBIN GLYCOSYLATED A1C: CPT | Mod: QW

## 2023-07-12 RX ORDER — LANCETS 33 GAUGE
EACH MISCELLANEOUS
Qty: 1 | Refills: 2 | Status: ACTIVE | COMMUNITY
Start: 2023-01-13 | End: 1900-01-01

## 2023-07-12 RX ORDER — BLOOD SUGAR DIAGNOSTIC
STRIP MISCELLANEOUS TWICE DAILY
Qty: 1 | Refills: 2 | Status: ACTIVE | COMMUNITY
Start: 2023-01-13 | End: 1900-01-01

## 2023-07-12 NOTE — HISTORY OF PRESENT ILLNESS
[FreeTextEntry1] : Patient is a 44 yo woman here for diabetes and hypothyroid follow up\par \par Prediabetes was diagnosed in 2021 then at the end of 2021, she was diagnosed with diabetes. Her PCP at [facility] diagnosed her on routine blood work. She was started on metformin and was advised to see an endocrinologist. She had a tough time getting an endocrine visit and had endometrial issues in the interval. She reports that diabetes happened after IVF during which she gained weight. Patient has had several miscarriages and she was diagnosed with endometriosis and inflammation.\par Patient had her first visit here January 2023 at which point A1c was 8.1%.  She was asked to maximize dose of metformin to 1000 mg BID. Due to reproductive family planning, other medicines were not started in favor of lifestyle modifications.  On metformin 1000 mg BID, she was checking sugars and the values were in the 180s, AM values were in the 150s.  She felt the metformin was not working and she went to [facility]/telehealth endocrine Dr. Socorro Acosta prescribed ozempic.  Started on 0.25 mg weekly for four weeks then increased to 0.5 mg weekly.  \par Patient is adhering to ozempic 0.5 mg weekly and metformin 1000 mg BID.  \par AM glucose is in the 100s and at bedtime values are in the 120; after meals values are 140\par First week, she experienced nausea but now resolved\par Menarche at age 7; pubic hair growth/breast growth all within expected ranges\par Periods had always been very heavy and debilitating in the past\par Diet: now that she started ozempic, when she has "shitty food" will feel nauseous.  After eating a whopper [place] at [employer] it made her not want to eat it.  She has been eating a lot of vegetables.  Stopped eating fast foods; eating strawberries, blueberries instead of cake. Drinks less soda than before.  Occasional orange juice\par Dilated eye exam over 1 year ago; hasn't scheduled her visit yet\par Fertility planning is off for now\par No personal/family hx of pancreatitis/MET/MTC\par \par Diagnosed with hypothyroidism: on levothyroxine 25 mcg years ago. Patient stopped taking it October 2022 because she forgot to take medicine and didn't feel like it was doing anything.\par She is currently on many anti-inflammatory medicines for conception\par No family hx of thyroid disease \par TSH checked January 2023 was at goal 1.7 \par

## 2023-07-12 NOTE — REVIEW OF SYSTEMS
[Fatigue] : no fatigue [Recent Weight Gain (___ Lbs)] : recent weight gain: [unfilled] lbs [Dysphagia] : no dysphagia [Dysphonia] : no dysphonia [Chest Pain] : no chest pain [Palpitations] : no palpitations [Shortness Of Breath] : no shortness of breath [Nausea] : no nausea [Constipation] : no constipation [Vomiting] : no vomiting [Diarrhea] : no diarrhea

## 2023-07-12 NOTE — ASSESSMENT
[Carbohydrate Consistent Diet] : carbohydrate consistent diet [Importance of Diet and Exercise] : importance of diet and exercise to improve glycemic control, achieve weight loss and improve cardiovascular health [Self Monitoring of Blood Glucose] : self monitoring of blood glucose [Retinopathy Screening] : Patient was referred to ophthalmology for retinopathy screening [FreeTextEntry1] : Patient is a 42 yo woman with T2DM and hx of hypothyroid here for follow up\par \par 1. T2DM\par -the patient was seen January 2023 and started on metformin. During this time, she was considering reproductive fertility procedures. Since the last visit, she decided not to proceed with conception planning.  The patient had a telehealth visit with a doctor who started her on ozempic\par -she adheres to ozempic 0.5 mg weekly and metformion 1000 mg BID.  Denies any side effects at this time\par -diet is somewhat better in that the ozempic has decreased palatability of fast foods\par -encourage healthy eating\par -for now, A1c is at goal of 6.3% and we can decrease metformin to 1000 mg dailiy and continue ozempic 0.5 mg daily\par -continue with SMBG.  If SMBG levels are good, continue this regimen\par -requires dilated eye exam\par -confirm serum A1c levels today, nephropathy screen\par -declines nutritional consultation\par -explicitly educated that GLP 1 agonist is not approved during pregnancy or breast feeding.  Side effects including MEN/MTC/pancreatitis were discussed\par \par 2. Hx of hypothyroid\par -clinically and chemically euthyroid\par -repeat TSH today\par \par Follow up in 4 months. Call with hyperglycemic excursions

## 2023-07-12 NOTE — PHYSICAL EXAM
[Alert] : alert [Well Nourished] : well nourished [No Acute Distress] : no acute distress [EOMI] : extra ocular movement intact [Normal Hearing] : hearing was normal [Thyroid Not Enlarged] : the thyroid was not enlarged [No Respiratory Distress] : no respiratory distress [Clear to Auscultation] : lungs were clear to auscultation bilaterally [Normal S1, S2] : normal S1 and S2 [Normal Rate] : heart rate was normal [Normal Bowel Sounds] : normal bowel sounds [Not Tender] : non-tender [Soft] : abdomen soft [No Motor Deficits] : the motor exam was normal [Normal Affect] : the affect was normal [Normal Insight/Judgement] : insight and judgment were intact [Normal Mood] : the mood was normal

## 2023-07-13 LAB
ANION GAP SERPL CALC-SCNC: 10 MMOL/L
BUN SERPL-MCNC: 8 MG/DL
CALCIUM SERPL-MCNC: 9.7 MG/DL
CHLORIDE SERPL-SCNC: 107 MMOL/L
CO2 SERPL-SCNC: 26 MMOL/L
CREAT SERPL-MCNC: 0.72 MG/DL
EGFR: 105 ML/MIN/1.73M2
ESTIMATED AVERAGE GLUCOSE: 140 MG/DL
GLUCOSE SERPL-MCNC: 106 MG/DL
HBA1C MFR BLD HPLC: 6.5 %
POTASSIUM SERPL-SCNC: 5.1 MMOL/L
SODIUM SERPL-SCNC: 142 MMOL/L
T4 FREE SERPL-MCNC: 1.5 NG/DL
TSH SERPL-ACNC: 0.63 UIU/ML

## 2023-12-13 ENCOUNTER — TRANSCRIPTION ENCOUNTER (OUTPATIENT)
Age: 43
End: 2023-12-13

## 2023-12-13 RX ORDER — SEMAGLUTIDE 1.34 MG/ML
4 INJECTION, SOLUTION SUBCUTANEOUS
Qty: 3 | Refills: 0 | Status: ACTIVE | COMMUNITY
Start: 2023-07-12 | End: 1900-01-01

## 2024-01-23 ENCOUNTER — APPOINTMENT (OUTPATIENT)
Dept: ENDOCRINOLOGY | Facility: CLINIC | Age: 44
End: 2024-01-23
Payer: COMMERCIAL

## 2024-01-23 VITALS
BODY MASS INDEX: 22.2 KG/M2 | WEIGHT: 130 LBS | HEART RATE: 73 BPM | DIASTOLIC BLOOD PRESSURE: 88 MMHG | TEMPERATURE: 98 F | SYSTOLIC BLOOD PRESSURE: 133 MMHG | HEIGHT: 64 IN | OXYGEN SATURATION: 98 %

## 2024-01-23 DIAGNOSIS — E03.9 HYPOTHYROIDISM, UNSPECIFIED: ICD-10-CM

## 2024-01-23 DIAGNOSIS — E11.9 TYPE 2 DIABETES MELLITUS W/OUT COMPLICATIONS: ICD-10-CM

## 2024-01-23 LAB — HBA1C MFR BLD HPLC: 5.8

## 2024-01-23 PROCEDURE — 99214 OFFICE O/P EST MOD 30 MIN: CPT

## 2024-01-23 PROCEDURE — 36415 COLL VENOUS BLD VENIPUNCTURE: CPT

## 2024-01-23 PROCEDURE — 83036 HEMOGLOBIN GLYCOSYLATED A1C: CPT | Mod: QW

## 2024-01-23 RX ORDER — SEMAGLUTIDE 1.34 MG/ML
4 INJECTION, SOLUTION SUBCUTANEOUS
Qty: 9 | Refills: 3 | Status: ACTIVE | COMMUNITY
Start: 2024-01-23 | End: 1900-01-01

## 2024-01-23 RX ORDER — METFORMIN ER 500 MG 500 MG/1
500 TABLET ORAL
Qty: 1 | Refills: 3 | Status: DISCONTINUED | COMMUNITY
Start: 2023-03-16 | End: 2024-01-23

## 2024-01-23 NOTE — HISTORY OF PRESENT ILLNESS
[FreeTextEntry1] : Patient is a 42 yo woman here for diabetes and hypothyroid follow up  Prediabetes was diagnosed in 2021 then at the end of 2021, she was diagnosed with diabetes. Her PCP at [facility] diagnosed her on routine blood work. She was started on metformin and was advised to see an endocrinologist. She had a tough time getting an endocrine visit and had endometrial issues. She reported that diabetes happened after IVF during which she gained weight. Patient has had several miscarriages and she was diagnosed with endometriosis and inflammation. Patient had her first visit here January 2023 at which point A1c was 8.1%. She was asked to maximize dose of metformin to 1000 mg BID. Due to reproductive family planning, other medicines were not started in favor of lifestyle modifications. On metformin 1000 mg BID, she was checking sugars and the values were in the 180s, AM values were in the 150s. She felt the metformin was not working and she went to [facility]/telehealth endocrine Dr. Socorro Acosta prescribed ozempic. Started on 0.25 mg weekly for four weeks then increased to 0.5 mg weekly. Patient is adhering to ozempic 1 mg weekly Checks sugars and values are  in AM, post-prandial 130-150 Breakfast: oatmeal with coffee and honey Celery juice Snacks: banana;  Joes protein bar once in a while Dinner: salmon, steak, protein Monday-Friday: home cooked meals Will go out on the weekend once a week No alcohol Regular coke daily Dilated eye exam: it's needed, overdue No personal/family hx of pancreatitis/MET/MTC Takes lupron to help with endometriosis so no periods  Diagnosed with hypothyroidism: on levothyroxine 25 mcg years ago. Patient stopped taking it October 2022 because she forgot to take medicine and didn't feel like it was doing anything. No family hx of thyroid disease Patient has been chemically euthyroid off of thyroid medicine

## 2024-01-23 NOTE — PHYSICAL EXAM
[Alert] : alert [Well Nourished] : well nourished [No Acute Distress] : no acute distress [EOMI] : extra ocular movement intact [Normal Hearing] : hearing was normal [Thyroid Not Enlarged] : the thyroid was not enlarged [No Respiratory Distress] : no respiratory distress [Clear to Auscultation] : lungs were clear to auscultation bilaterally [Normal S1, S2] : normal S1 and S2 [Normal Rate] : heart rate was normal [Normal Bowel Sounds] : normal bowel sounds [Not Tender] : non-tender [Soft] : abdomen soft [Normal Gait] : normal gait [Right foot was examined, including] : right foot ~C was examined, including visual inspection with sensory and pulse exams [Left foot was examined, including] : left foot ~C was examined, including visual inspection with sensory and pulse exams [2+] : 2+ in the dorsalis pedis [No Motor Deficits] : the motor exam was normal [Normal Affect] : the affect was normal [Normal Insight/Judgement] : insight and judgment were intact [Normal Mood] : the mood was normal [Foot Ulcers] : no foot ulcers [#1 Diminished] : number 1 was normal [#2 Diminished] : number 2 was normal [#3 Diminished] : number 3 was normal [#4 Diminished] : number 4 was normal [#6 Diminished] : number 6 was normal [#5 Diminished] : number 5 was normal [#7 Diminished] : number 7 was normal [#9 Diminished] : number 9 was normal [#8 Diminished] : number 8 was normal [#10 Diminished] : number 10 was normal

## 2024-01-23 NOTE — ASSESSMENT
[Diabetes Foot Care] : diabetes foot care [Long Term Vascular Complications] : long term vascular complications of diabetes [Carbohydrate Consistent Diet] : carbohydrate consistent diet [Importance of Diet and Exercise] : importance of diet and exercise to improve glycemic control, achieve weight loss and improve cardiovascular health [Self Monitoring of Blood Glucose] : self monitoring of blood glucose [Retinopathy Screening] : Patient was referred to ophthalmology for retinopathy screening [FreeTextEntry1] : Patient is a 44 yo woman with T2DM and hx of hypothyroid here for follow up  1. T2DM -POCT A1c today is 5.8% and well controlled -the patient was seen January 2023 and started on metformin. During this time, she was considering reproductive fertility procedures. Since the last visit, she decided not to proceed with conception planning. -she adheres to ozempic 1 mg weekly and denies side effects -continue ozempic; refill provided -nutrition has improved, less fast foods. Encourage consistent carb, sugar limited diet -continue with SMBG. -requires dilated eye exam -confirm serum A1c levels today, nephropathy screen -monofilament testing completed  2. Hx of hypothyroid -clinically and chemically euthyroid -repeat TSH today  Follow up in 4 months.

## 2024-01-23 NOTE — REVIEW OF SYSTEMS
[Fatigue] : no fatigue [Dysphagia] : no dysphagia [Dysphonia] : no dysphonia [Chest Pain] : no chest pain [Slow Heart Rate] : heart rate is not slow [Palpitations] : no palpitations [Fast Heart Rate] : heart rate is not fast [Headaches] : no headaches

## 2024-01-24 ENCOUNTER — TRANSCRIPTION ENCOUNTER (OUTPATIENT)
Age: 44
End: 2024-01-24

## 2024-01-24 LAB
ALBUMIN SERPL ELPH-MCNC: 4.8 G/DL
ALP BLD-CCNC: 91 U/L
ALT SERPL-CCNC: 24 U/L
ANION GAP SERPL CALC-SCNC: 11 MMOL/L
AST SERPL-CCNC: 22 U/L
BILIRUB SERPL-MCNC: 0.3 MG/DL
BUN SERPL-MCNC: 11 MG/DL
CALCIUM SERPL-MCNC: 9.8 MG/DL
CHLORIDE SERPL-SCNC: 102 MMOL/L
CHOLEST SERPL-MCNC: 225 MG/DL
CO2 SERPL-SCNC: 25 MMOL/L
CREAT SERPL-MCNC: 0.67 MG/DL
CREAT SPEC-SCNC: 78 MG/DL
EGFR: 110 ML/MIN/1.73M2
ESTIMATED AVERAGE GLUCOSE: 117 MG/DL
GLUCOSE SERPL-MCNC: 88 MG/DL
HBA1C MFR BLD HPLC: 5.7 %
HDLC SERPL-MCNC: 53 MG/DL
LDLC SERPL CALC-MCNC: 134 MG/DL
MICROALBUMIN 24H UR DL<=1MG/L-MCNC: <1.2 MG/DL
MICROALBUMIN/CREAT 24H UR-RTO: NORMAL MG/G
NONHDLC SERPL-MCNC: 172 MG/DL
POTASSIUM SERPL-SCNC: 4.4 MMOL/L
PROT SERPL-MCNC: 7.3 G/DL
SODIUM SERPL-SCNC: 139 MMOL/L
T4 FREE SERPL-MCNC: 1.4 NG/DL
TRIGL SERPL-MCNC: 214 MG/DL
TSH SERPL-ACNC: 1.55 UIU/ML

## (undated) DEVICE — XI TIP COVER

## (undated) DEVICE — ELCTR LOOP FOR LLETZ 15MM X 12MM

## (undated) DEVICE — ELCTR BOVIE PENCIL BLADE 10FT

## (undated) DEVICE — ELCTR LOOP FOR LLETZ 20MM X 12MM

## (undated) DEVICE — TROCAR APPLIED MEDICAL KII FIOS FIRST ENTRY 5MM X 100MM ADVANCED FIXATION

## (undated) DEVICE — Device

## (undated) DEVICE — SUT PDS II PLUS 1 27" CT-1

## (undated) DEVICE — XI ARM FORCEP MARYLAND BIPOLAR

## (undated) DEVICE — XI ARM FORCEP TENACULUM

## (undated) DEVICE — XI DRAPE COLUMN

## (undated) DEVICE — BLADE SCALPEL SAFETY #11 WITH PLASTIC GREEN HANDLE

## (undated) DEVICE — TUBING AIRSEAL TRI-LUMEN FILTERED

## (undated) DEVICE — POSITIONER FOAM EGG CRATE ULNAR 2PCS (PINK)

## (undated) DEVICE — DRAPE TOWEL BLUE 17" X 24"

## (undated) DEVICE — TUBING STRYKER PNEUMOCLEAR HIGH FLOW HEATED

## (undated) DEVICE — XI 12MM AND STAPLER CANNULA SEAL

## (undated) DEVICE — XI ARM SCISSOR MONO CURVED

## (undated) DEVICE — ELCTR LOOP FOR LLETZ 10MM

## (undated) DEVICE — SUT VLOC 180 2-0 9" GS-22 GREEN

## (undated) DEVICE — SUT VICRYL 0 27" UR-6

## (undated) DEVICE — TIP METZENBAUM SCISSOR MONOPOLAR ENDOCUT (ORANGE)

## (undated) DEVICE — SUT VICRYL 3-0 18" TIES UNDYED

## (undated) DEVICE — PREP CHLORAPREP HI-LITE ORANGE 26ML

## (undated) DEVICE — DRAPE LEGGINGS XL

## (undated) DEVICE — GLV 6.5 PROTEXIS (WHITE)

## (undated) DEVICE — XI ARM GRASPER TIP UP FENESTRATED

## (undated) DEVICE — XI DRAPE ARM

## (undated) DEVICE — GLV 8 PROTEXIS (WHITE)

## (undated) DEVICE — XI ENDOWRIST 12 - 8 MM CANNULA REDUCER

## (undated) DEVICE — XI ARM NEEDLE DRIVER LARGE

## (undated) DEVICE — ENDOCATCH 10MM SPECIMEN POUCH

## (undated) DEVICE — TROCAR COVIDIEN VERSAONE FIXATION CANNULA 5MM

## (undated) DEVICE — UTERINE MANIPULATOR THOMAS MEDICAL 4.5MM

## (undated) DEVICE — GLV 7.5 PROTEXIS (WHITE)

## (undated) DEVICE — DRAPE 3/4 SHEET 52X76"

## (undated) DEVICE — MARKING PEN W RULER

## (undated) DEVICE — DRAPE MAYO STAND 30"

## (undated) DEVICE — DRAPE 1/2 SHEET 40X57"

## (undated) DEVICE — BLADE SCALPEL SAFETYLOCK #15

## (undated) DEVICE — SLV COMPRESSION CALF LG CTC

## (undated) DEVICE — NDL WILLIAMS CYSTOSCOPIC INJECTION 5FR 23G X 35CM

## (undated) DEVICE — SUT VICRYL PLUS 0 36" CT-1

## (undated) DEVICE — PACK PERI GYN

## (undated) DEVICE — SYR LUER LOK 30CC

## (undated) DEVICE — NDL SPINAL 22G X 3.5" (BLACK)

## (undated) DEVICE — FOLEY TRAY 16FR 5CC LF UMETER CLOSED

## (undated) DEVICE — TUBING CAP SET AIR AND WATER FOR OLYMPUS SCOPE 0.4M

## (undated) DEVICE — TROCAR COVIDIEN VERSAPORT BLADELESS OPTICAL 12MM STANDARD

## (undated) DEVICE — VENODYNE/SCD SLEEVE CALF MEDIUM

## (undated) DEVICE — POSITIONER STRAP KNEE & BODY 3X60" DISP

## (undated) DEVICE — SUT SILK 2-0 30" PSL

## (undated) DEVICE — INSUFFLATION NDL COVIDIEN SURGINEEDLE VERESS 120MM

## (undated) DEVICE — TUBING STRYKER PNEUMOCLEAR SMOKE EVACUATION HIGH FLOW

## (undated) DEVICE — DRSG STERISTRIPS 0.5 X 4"

## (undated) DEVICE — TROCAR SURGIQUEST AIRSEAL 5MM X 100MM

## (undated) DEVICE — PACK GYN WDC

## (undated) DEVICE — ELCTR LOOP FOR LLETZ 20MM X 15MM

## (undated) DEVICE — XI ARM FORCEP PROGRASP 8MM

## (undated) DEVICE — SYR LUER LOK 50CC

## (undated) DEVICE — ELCTR BALL LLETZ LG 5MM

## (undated) DEVICE — TROCAR COVIDIEN VERSAPORT BLADELESS OPTICAL 5MM STANDARD

## (undated) DEVICE — DRSG DERMABOND 0.7ML

## (undated) DEVICE — LIGASURE BLUNT TIP 37CM

## (undated) DEVICE — POOLE SUCTION TIP 10FT

## (undated) DEVICE — WARMING BLANKET UPPER ADULT

## (undated) DEVICE — SUT MONOCRYL 4-0 27" PS-2 UNDYED

## (undated) DEVICE — XI OBTURATOR OPTICAL BLADELESS 8MM

## (undated) DEVICE — PACK D&C

## (undated) DEVICE — GLV 7 PROTEXIS (WHITE)

## (undated) DEVICE — SUT VICRYL 3-0 27" SH

## (undated) DEVICE — SPECIMEN CONTAINER 4OZ

## (undated) DEVICE — PACK GENERAL CLOSING

## (undated) DEVICE — XI ARM FORCEP FENESTRATED BIPOLAR 8MM

## (undated) DEVICE — KIT ENDO PROCEDURE CUST W/VLV

## (undated) DEVICE — POSITIONER PINK PAD PIGAZZI SYSTEM XL W ARM PROTECTOR

## (undated) DEVICE — D HELP - CLEARVIEW CLEARIFY SYSTEM

## (undated) DEVICE — STOPCOCK 4-WAY

## (undated) DEVICE — APPLICATOR SURGICEL LAP TROCAR POINT 2.5MM X 150MM

## (undated) DEVICE — DRAPE IOBAN 13" X 13"

## (undated) DEVICE — XI SEAL UNIV 5- 8 MM

## (undated) DEVICE — DRAPE TOP SHEET 53" X 101"

## (undated) DEVICE — GLV 7 DERMAPRENE ULTRA